# Patient Record
Sex: FEMALE | Race: BLACK OR AFRICAN AMERICAN
[De-identification: names, ages, dates, MRNs, and addresses within clinical notes are randomized per-mention and may not be internally consistent; named-entity substitution may affect disease eponyms.]

---

## 2018-06-15 ENCOUNTER — HOSPITAL ENCOUNTER (EMERGENCY)
Dept: HOSPITAL 44 - ED | Age: 38
Discharge: HOME | End: 2018-06-15
Payer: SELF-PAY

## 2018-06-15 DIAGNOSIS — S61.412A: Primary | ICD-10-CM
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
## 2018-06-15 PROCEDURE — 12001 RPR S/N/AX/GEN/TRNK 2.5CM/<: CPT

## 2018-06-15 PROCEDURE — 90715 TDAP VACCINE 7 YRS/> IM: CPT

## 2018-06-15 PROCEDURE — 90471 IMMUNIZATION ADMIN: CPT

## 2018-06-15 RX ADMIN — CLOSTRIDIUM TETANI TOXOID ANTIGEN (FORMALDEHYDE INACTIVATED), CORYNEBACTERIUM DIPHTHERIAE TOXOID ANTIGEN (FORMALDEHYDE INACTIVATED), BORDETELLA PERTUSSIS TOXOID ANTIGEN (GLUTARALDEHYDE INACTIVATED), BORDETELLA PERTUSSIS FILAMENTOUS HEMAGGLUTININ ANTIGEN (FORMALDEHYDE INACTIVATED), BORDETELLA PERTUSSIS PERTACTIN ANTIGEN, AND BORDETELLA PERTUSSIS FIMBRIAE 2/3 ANTIGEN ONE ML: 5; 2; 2.5; 5; 3; 5 INJECTION, SUSPENSION INTRAMUSCULAR at 23:45

## 2018-06-15 NOTE — ED PHYSICIAN DOCUMENTATION
General Adult





- HISTORIAN


Historian: patient





- HPI


Stated Complaint: hand lac


Chief Complaint: General Adult


Additional Information: 





Cut left palm with scissors while cutting hair at 1730 today. No treatment 

attempted. Unknown last tetanus. 


Onset: hours





- ROS


CONST: no problems





- PAST HX


Past History: none


Allergies/Adverse Reactions: 


 Allergies











Allergy/AdvReac Type Severity Reaction Status Date / Time


 


No Known Drug Allergies Allergy   Unverified 11/01/13 15:00

















- SOCIAL HX


Smoking History: non-smoker





- FAMILY HX


Family History: No





- REVIEWED ASSESSMENTS


Nursing Assessment  Reviewed: Yes


Vitals Reviewed: Yes





Procedures


Wound Location: upper extremity


Wound Length: 1.5


Wound's Depth, Shape: superficial


Wound Explored: clean


Betadine Prep?: No (chlorhexadine, NS)


Wound Repaired With: Dermabond





ED Results Lab/Radiology





- Orders


Orders: 





 ED Orders











 Category Date Time Status


 


 Cleanse with NS and Chlorhexid 1T Care  06/15/18 23:08 Ordered


 


 Diph,Pertuss(Acell),Tet Vac/Pf [Adacel] Med  06/15/18 23:08 Once





 0.5 ml IM .ONCE ONE   














General Adult Physical Exam





- PHYSICAL EXAM


GENERAL APPEARANCE: mild distress (anxious)


EENT: eye inspection normal, ENT inspection normal


NECK: supple


RESPIRATORY: no resp distress


BACK: other (movements w/o pain)


SKIN: warm/dry, other (1.5 cm L-shaped lac left palm, supercicial. Full active 

ROM. )


EXTREMITIES: normal range of motion (gait and stance), no evidence of injury


NEURO: CN's nml as tested, motor nml, sensation nml, cognition normal





Discharge


Clincal Impression: 


Hand laceration


Qualifiers:


 Encounter type: initial encounter Foreign body presence: without foreign body 

Laterality: left Qualified Code(s): S61.412A - Laceration without foreign body 

of left hand, initial encounter





Referrals: 


Marli Garcia FNP [Primary Care Provider] - 2 Days


Additional Instructions: 


Allow the glue to wear off. Don't scratch or pick at it. Wear a glove on the 

hand when seeing clients. 


Condition: Good


Disposition: 01 HOME, SELF-CARE


Decision to Admit: NO


Decision Time: 23:32

## 2018-06-16 VITALS — DIASTOLIC BLOOD PRESSURE: 68 MMHG | SYSTOLIC BLOOD PRESSURE: 139 MMHG
